# Patient Record
(demographics unavailable — no encounter records)

---

## 2017-03-08 NOTE — ERD
ER Documentation


Chief Complaint


Date/Time


DATE: 3/8/17 


TIME: 17:34


Chief Complaint


pt taking anti-anxiety meds but meds making her feel worse





HPI


This is a 67-year-old female who presents to the emergency department today 

with her sister for  "feeling bad".  Patient states that she recently was 

started 2 weeks ago on Sertraline for depression and she took it 2 times and 

started not feeling well after it.  She continue taking it and still felt bad.  

States that she feels more sad and more depressed and she went to her primary 

care doctor 4 days ago but he did not make any medication changes at that time.

  States that she has a history of breast cancer with mastectomy in her right 

breast and they have recently found a lump in her left breast and she is very 

anxious and worried about that.  Denies any fevers or chills, night sweats,, 

nausea or vomiting, suicidal or homicidal ideation.





ROS


All systems reviewed and are negative except as per history of present illness.





Medications


Home Meds


Active Scripts


Lorazepam* (Ativan*) 0.5 Mg Tablet, 0.5 MG PO Q8, #10 TAB


   Prov:MAURO NGUYEN PA-C         3/8/17


Hydrocodone Bit-Acetaminophen* (Norco*) 5-325 Mg Tab, 1 TAB PO Q6 Y for PAIN 

for 4 Days, TAB


   Prov:SHARLA PHILIPPE MD         9/7/15


Ibuprofen* (Motrin*) 600 Mg Tab, 600 MG PO Q6, #30 TAB


   Prov:SHARLA PHILIPPE MD         9/7/15


Lorazepam* (Ativan*) 0.5 Mg Tablet, 0.5 MG PO Q8 for 7 Days


   Prov:SHARLA PHILIPPE MD         9/7/15


Reported Medications


Omeprazole* (Omeprazole*) 40 Mg Capsule.dr, 40 MG PO DAILY, CAP


   9/7/15


Letrozole* (Letrozole*) 2.5 Mg Tablet, 2.5 MG PO DAILY, TAB


   9/7/15





Allergies


Allergies:  


Coded Allergies:  


     Acetaminophen (Verified  Allergy, DONT'T REMEMBER, 9/27/12)


     Sulfa(Sulfonamide Antibiotics) (Verified  Allergy, DONT'T REMEMBER, 9/27/12

)


     ciprofloxacin (Verified  Allergy, DONT'T REMEMBER, 9/27/12)


     ciprofloxacin HCl (Verified  Allergy, DONT'T REMEMBER, 9/27/12)


     hydrocodone bit (Verified  Allergy, DONT'T REMEMBER, 9/27/12)





PMhx/Soc


History of Surgery:  Yes (RIGHT BREAST MASTECTOMY FOR CA)


Anesthesia Reaction:  No


Hx Neurological Disorder:  No


Hx Respiratory Disorders:  No


Hx Cardiac Disorders:  No


Hx Psychiatric Problems:  No


Hx Alcohol Use:  No


Hx Substance Use:  No


Hx Tobacco Use:  No





Physical Exam


Vitals





Vital Signs








  Date Time  Temp Pulse Resp B/P Pulse Ox O2 Delivery O2 Flow Rate FiO2


 


3/8/17 13:19 98.2 75 18 131/62 98   








Physical Exam


Const:     No acute distress


Head:   Atraumatic 


Eyes:    Normal Conjunctiva


ENT:    Normal External Ears, Nose and Mouth.


Neck:               Full range of motion..~ No meningismus.


Resp:    Clear to auscultation bilaterally


Cardio:    Regular rate and rhythm, no murmurs


Skin:    No petechiae or rashes


Neur:    Awake and alert


Psych:    Normal Mood and Affect





Procedures/MDM


This is 67-year-old female who presents to the emergency department today for 

"feeling bad" after starting antidepressant medication.  Patient states that 

she feels down however she denies any suicidal or homicidal ideations.  States 

that she is waiting for a biopsy of her left breast and she is very anxious 

about that as she is ready had breast cancer in her right breast.  Patient is 

afebrile and otherwise well-appearing here in the emergency department.  

Patient symptoms at this time is consistent with anxiety.  Patient does appear 

to have family support and support through her [employer].  I have encouraged her 

to keep involved with that.  I did explain to the patient that I did not feel 

comfortable changing her antidepressant medication here in the emergency 

department.  I have explained to her that it can oftentimes take 2 weeks for 

these medications to take effect and yester may be some negative side effects 

from it and if it was unable to be tolerated that she should follow-up with her 

primary care physician to change that medication.  Patient understood.  Patient 

was given a prescription for a very low dose of Lorazepam.  Patient states that 

she has taken in the past but has not taken any for quite some time.  Do not 

feel this patient is a risk to herself and others and I feel that she is stable 

for discharge and outpatient management.





At this time the patient is stable for discharge and outpatient management. 

Patient should follow up with their PCP in the next 1-2 days.  They may return 

to the emergency department sooner for any persistent or worsening of symptoms.

  Patient understood and agreed with the plan.





Departure


Diagnosis:  


 Primary Impression:  


 Anxiety


Condition:  Fair


Patient Instructions:  Your Body's Response to Anxiety





Additional Instructions:  


Llame al doctor MAANA y юлия sana MICHA PARA DENTRO DE 1-2 SIGALA.Dgale a la 

secretaria que nosotros le instruimos hacer esta micha.Avise o llame si bonilla 

condicin se empeora antes de la micha. Regresa aqui si peor o no mejor.





Take medication as prescribed for anxiety


Follow-up with your primary care doctor about your depression medication











MAURO NGUYEN PA-C Mar 8, 2017 17:40